# Patient Record
Sex: FEMALE | Race: WHITE | NOT HISPANIC OR LATINO | Employment: UNEMPLOYED | URBAN - NONMETROPOLITAN AREA
[De-identification: names, ages, dates, MRNs, and addresses within clinical notes are randomized per-mention and may not be internally consistent; named-entity substitution may affect disease eponyms.]

---

## 2017-03-08 ENCOUNTER — OFFICE VISIT (OUTPATIENT)
Dept: FAMILY MEDICINE CLINIC | Facility: CLINIC | Age: 52
End: 2017-03-08

## 2017-03-08 VITALS
HEART RATE: 84 BPM | SYSTOLIC BLOOD PRESSURE: 138 MMHG | OXYGEN SATURATION: 99 % | DIASTOLIC BLOOD PRESSURE: 80 MMHG | BODY MASS INDEX: 32.86 KG/M2 | WEIGHT: 216.1 LBS

## 2017-03-08 DIAGNOSIS — M54.50 ACUTE MIDLINE LOW BACK PAIN WITHOUT SCIATICA: ICD-10-CM

## 2017-03-08 DIAGNOSIS — R10.31 RLQ ABDOMINAL PAIN: ICD-10-CM

## 2017-03-08 DIAGNOSIS — Z12.39 SCREENING FOR BREAST CANCER: ICD-10-CM

## 2017-03-08 DIAGNOSIS — Z23 NEED FOR TDAP VACCINATION: ICD-10-CM

## 2017-03-08 DIAGNOSIS — I49.9 IRREGULAR HEART BEAT: Primary | ICD-10-CM

## 2017-03-08 DIAGNOSIS — Z23 PNEUMOCOCCAL VACCINATION ADMINISTERED AT CURRENT VISIT: ICD-10-CM

## 2017-03-08 DIAGNOSIS — Z12.4 SCREENING FOR CERVICAL CANCER: ICD-10-CM

## 2017-03-08 PROCEDURE — 90471 IMMUNIZATION ADMIN: CPT | Performed by: FAMILY MEDICINE

## 2017-03-08 PROCEDURE — 90715 TDAP VACCINE 7 YRS/> IM: CPT | Performed by: FAMILY MEDICINE

## 2017-03-08 PROCEDURE — 90732 PPSV23 VACC 2 YRS+ SUBQ/IM: CPT | Performed by: FAMILY MEDICINE

## 2017-03-08 PROCEDURE — 90472 IMMUNIZATION ADMIN EACH ADD: CPT | Performed by: FAMILY MEDICINE

## 2017-03-08 PROCEDURE — 93010 ELECTROCARDIOGRAM REPORT: CPT | Performed by: INTERNAL MEDICINE

## 2017-03-08 PROCEDURE — 93005 ELECTROCARDIOGRAM TRACING: CPT | Performed by: FAMILY MEDICINE

## 2017-03-08 PROCEDURE — 99214 OFFICE O/P EST MOD 30 MIN: CPT | Performed by: FAMILY MEDICINE

## 2017-03-09 DIAGNOSIS — R10.31 RLQ ABDOMINAL PAIN: Primary | ICD-10-CM

## 2017-03-17 ENCOUNTER — APPOINTMENT (OUTPATIENT)
Dept: CT IMAGING | Facility: HOSPITAL | Age: 52
End: 2017-03-17
Attending: FAMILY MEDICINE

## 2017-03-29 ENCOUNTER — TELEPHONE (OUTPATIENT)
Dept: FAMILY MEDICINE CLINIC | Facility: CLINIC | Age: 52
End: 2017-03-29

## 2017-03-30 NOTE — TELEPHONE ENCOUNTER
RESULTS & RECOMMENDATIONS RELAYED pr Dr. Hernandez's instruction  Ms Gutierrez has been called with her 24 hour Holter Monitor Results, they were Normal.      ----- Message from Enmanuel Hernandez MD sent at 3/24/2017 12:33 PM CDT -----  Normal

## 2017-05-17 NOTE — PROGRESS NOTES
Subjective   Maryuri Gutierrez is a 52 y.o. female.     Back Pain   This is a recurrent problem. The current episode started 1 to 4 weeks ago. The problem occurs constantly. The problem has been waxing and waning since onset. The pain is present in the lumbar spine. The quality of the pain is described as stabbing. Radiates to: to lower abd on right. The pain is at a severity of 4/10. The pain is mild. The pain is the same all the time. The symptoms are aggravated by lying down. Stiffness is present in the morning. Associated symptoms include abdominal pain. Pertinent negatives include no bladder incontinence, bowel incontinence, chest pain, dysuria or fever.   Abdominal Pain   This is a recurrent problem. The current episode started 1 to 4 weeks ago. The onset quality is sudden. The problem occurs intermittently. The problem has been rapidly worsening. The pain is located in the RLQ. The pain is at a severity of 4/10. The pain is mild. The quality of the pain is sharp. The abdominal pain does not radiate. Associated symptoms include nausea. Pertinent negatives include no diarrhea, dysuria, fever, hematochezia, hematuria, melena or vomiting.   Palpitations    This is a recurrent problem. The current episode started more than 1 month ago. The problem has been waxing and waning. On average, each episode lasts 1 minute. Associated symptoms include an irregular heartbeat, malaise/fatigue and nausea. Pertinent negatives include no chest pain, fever, near-syncope or vomiting.        The following portions of the patient's history were reviewed and updated as appropriate: allergies, current medications, past family history, past medical history, past social history, past surgical history and problem list.    Review of Systems   Constitutional: Positive for malaise/fatigue. Negative for fever.   Cardiovascular: Positive for palpitations. Negative for chest pain and near-syncope.   Gastrointestinal: Positive for abdominal  pain and nausea. Negative for bowel incontinence, diarrhea, hematochezia, melena and vomiting.   Genitourinary: Negative for bladder incontinence, dysuria and hematuria.   Musculoskeletal: Positive for back pain.       Objective   Physical Exam   Constitutional: She is oriented to person, place, and time. She appears well-developed and well-nourished. No distress.   HENT:   Head: Normocephalic and atraumatic.   Cardiovascular: Normal rate, regular rhythm and normal heart sounds.  Exam reveals no gallop and no friction rub.    No murmur heard.  Pulmonary/Chest: Effort normal and breath sounds normal. No respiratory distress. She has no wheezes. She has no rales. She exhibits no tenderness.   Abdominal: Soft. Normal appearance. There is no hepatosplenomegaly. There is tenderness in the right lower quadrant. There is no rigidity, no rebound and no guarding.   Neurological: She is alert and oriented to person, place, and time.   Skin: Skin is warm and dry. She is not diaphoretic.   Psychiatric: She has a normal mood and affect. Her behavior is normal. Judgment and thought content normal.   Nursing note and vitals reviewed.      Assessment/Plan   Problems Addressed this Visit     None      Visit Diagnoses     Irregular heart beat    -  Primary    Relevant Orders    Ambulatory Referral to Cardiology    Holter Monitor - 24 Hour    RLQ abdominal pain        Relevant Orders    Comprehensive Metabolic Panel    Lipase    CBC Auto Differential    Urinalysis With Microscopic    Amylase    CT Abdomen Pelvis With & Without Contrast    Acute midline low back pain without sciatica        Screening for breast cancer        Relevant Orders    Mammo Screening Bilateral With CAD    Screening for cervical cancer        Relevant Orders    Ambulatory Referral to Family Practice                  close supervision/contact guard

## 2017-09-14 ENCOUNTER — TRANSCRIBE ORDERS (OUTPATIENT)
Dept: ULTRASOUND IMAGING | Facility: HOSPITAL | Age: 52
End: 2017-09-14

## 2017-09-14 DIAGNOSIS — E01.0 THYROMEGALY: Primary | ICD-10-CM

## 2017-09-19 ENCOUNTER — HOSPITAL ENCOUNTER (OUTPATIENT)
Dept: ULTRASOUND IMAGING | Facility: HOSPITAL | Age: 52
Discharge: HOME | End: 2017-09-19
Admitting: NURSE PRACTITIONER

## 2017-09-19 DIAGNOSIS — E01.0 THYROMEGALY: ICD-10-CM

## 2017-09-19 PROCEDURE — 76536 US EXAM OF HEAD AND NECK: CPT

## 2019-07-10 ENCOUNTER — OFFICE VISIT (OUTPATIENT)
Dept: GASTROENTEROLOGY | Facility: CLINIC | Age: 54
End: 2019-07-10

## 2019-07-10 VITALS
HEIGHT: 68 IN | HEART RATE: 99 BPM | WEIGHT: 195.8 LBS | SYSTOLIC BLOOD PRESSURE: 104 MMHG | BODY MASS INDEX: 29.67 KG/M2 | DIASTOLIC BLOOD PRESSURE: 68 MMHG

## 2019-07-10 DIAGNOSIS — K21.00 GASTROESOPHAGEAL REFLUX DISEASE WITH ESOPHAGITIS: ICD-10-CM

## 2019-07-10 DIAGNOSIS — R19.7 DIARRHEA, UNSPECIFIED TYPE: Primary | ICD-10-CM

## 2019-07-10 DIAGNOSIS — Z87.19 HISTORY OF COLITIS: ICD-10-CM

## 2019-07-10 DIAGNOSIS — R10.84 GENERALIZED ABDOMINAL PAIN: ICD-10-CM

## 2019-07-10 DIAGNOSIS — R11.0 NAUSEA: ICD-10-CM

## 2019-07-10 PROCEDURE — 99214 OFFICE O/P EST MOD 30 MIN: CPT | Performed by: PHYSICIAN ASSISTANT

## 2019-07-10 RX ORDER — DEXTROSE AND SODIUM CHLORIDE 5; .45 G/100ML; G/100ML
30 INJECTION, SOLUTION INTRAVENOUS CONTINUOUS PRN
Status: CANCELLED | OUTPATIENT
Start: 2019-08-12

## 2019-07-10 RX ORDER — ERGOCALCIFEROL 1.25 MG/1
50000 CAPSULE ORAL WEEKLY
COMMUNITY

## 2019-07-10 RX ORDER — FEXOFENADINE HCL 180 MG/1
180 TABLET ORAL DAILY
COMMUNITY

## 2019-07-10 RX ORDER — SODIUM, POTASSIUM,MAG SULFATES 17.5-3.13G
SOLUTION, RECONSTITUTED, ORAL ORAL
Qty: 1 BOTTLE | Refills: 0 | Status: SHIPPED | OUTPATIENT
Start: 2019-07-10 | End: 2019-08-12 | Stop reason: HOSPADM

## 2019-07-10 RX ORDER — PROMETHAZINE HYDROCHLORIDE 25 MG/1
25 TABLET ORAL EVERY 6 HOURS PRN
COMMUNITY

## 2019-07-10 RX ORDER — SUCRALFATE 1 G/1
1 TABLET ORAL 2 TIMES DAILY
COMMUNITY

## 2019-07-10 RX ORDER — PANTOPRAZOLE SODIUM 40 MG/1
40 TABLET, DELAYED RELEASE ORAL DAILY
COMMUNITY

## 2019-07-10 RX ORDER — DICYCLOMINE HYDROCHLORIDE 10 MG/1
10 CAPSULE ORAL 3 TIMES DAILY
COMMUNITY
End: 2019-08-06

## 2019-07-10 RX ORDER — RANITIDINE HCL 75 MG
75 TABLET ORAL DAILY
COMMUNITY
End: 2019-08-08

## 2019-07-10 RX ORDER — LEVOTHYROXINE SODIUM 0.03 MG/1
25 TABLET ORAL DAILY
COMMUNITY

## 2019-07-10 NOTE — PROGRESS NOTES
Chief Complaint   Patient presents with   • Abdominal Pain     Ref. LORAINE BLEVINS   • Heartburn       ENDO PROCEDURE ORDERED: EGD/COLON N/abd pain, look TI    Subjective    Maryuri Gutierrez is a 54 y.o. female. she is being seen for consultation today at the request of GEN Hussein.    History of Present Illness    This 54-year-old female works in [employer] and was sent for consultation by GEN Hussein, who saw the patient on 05/07/2019 with complaints of nausea, abdominal pain, increased reflux, and diarrhea.  Patient had been referred to Dr. Bird, then to Dr. Mac, then to Dr. Albarran, then to Dr. Delgado.  I had last seen her on 09/17/2015 with abdominal pain, history of colitis with diverticular disease.  She had not returned for a follow-up.  She was accompanied by her sister.  She had an abdominal ultrasound at [place] on 05/10/2019 that showed a normal liver, postcholecystectomy, and 5.0 mm common bile duct.  Last laboratories from 11/08/2018 showed normal CBC, CMP, and TSH.  Vitamin D was low at 19.        Patient states her abdomen swells.  She hurts all over.  She has had a fever that lasts several days at a time.  She had a previous episode about a year ago, but she feels like she is getting worse.  She states she went to see someone in GI in [place] a year ago and they gave her some kind of medications that she thought helped her, but her last endoscopy was with us on 09/02/2015.  She had a grade 2 esophagitis, gastritis and hemorrhoids.             Patient states that she has diarrhea daily that has been worse since cholecystectomy that was done by Dr. Grimaldo.  When she eats, she has urgent bowel movements.  Her stools are watery to formed.  She has been given Bentyl in the past, but it seems to bloat her more.  She denied blood or mucus in her stool.  Weight is down 6.5 pounds since her last visit with me.  She has not noticed any specific foods that seem to change  her symptoms.  Her pain is fairly constant.  She has severe fatigue.  She has nausea all the time.  She is on Protonix, Carafate b.i.d., Zantac 75 mg at night.  They have helped some.  She complains of 3 out of 10 epigastric pain today.  She states she will get a knot the size of a ball in her epigastric region.        Patient is a current pack-a-day smoker for 38 years, strongly encouraged to quit.  She denied alcohol or illicit substance use.      She has a history of colitis, gallstones, cholecystectomy, appendectomy, knee surgery, and hysterectomy.        Family history of diabetes, heart disease, unknown cancer, stroke, gallstones, hypertension, kidney disease, nervous problems, and allergies.  Father and mother are in good health.  Spouse  of a heart attack at age 59 in 2017.  One brother, 1 sister, and 1 child in good health.          A/P:  Patient with nausea, increasing GERD, abdominal pain, diarrhea, with previous history of colitis.  Recommend EGD/colonoscopy.  Will attempt to look at the terminal ileum.  Will do biopsies as indicated.      Possible bile salt reflux and diarrhea.  Suggested a trial on Colestid 1 g b.i.d.  She may try stopping her other medications as they do not seem to be helping her particularly, the Carafate and Bentyl.  Will plan follow-up after the above.  Further pending clinical course and the results of the above.         Thank you very much, Priscila, for this consultation, and for allowing us to participate in the care of your patient.  Will keep you informed.         The following portions of the patient's history were reviewed and updated as appropriate:   Past Medical History:   Diagnosis Date   • Abdominal pain     through to back   • Acute bronchitis, unspecified    • Acute serous otitis media, bilateral    • Biliary dyskinesia    • Chest pain    • Cholelithiasis and cholecystitis without obstruction    • Chronic cholecystitis    • Colitis    • Depressive disorder     • Diarrhea    • Esophagitis     Grade II on Prilosec OTC BID      • Gastritis    • Generalized abdominal pain    • GERD (gastroesophageal reflux disease)    • Indeterminate colitis    • Nausea & vomiting    • Pain in right knee    • Periumbilical pain      Past Surgical History:   Procedure Laterality Date   • COLONOSCOPY  07/07/2014    Internal & external hemorrhoids found.   • COLONOSCOPY  09/02/2015    Internal and external hemorrhoids found.   • INJECTION OF MEDICATION  10/21/2014    Celestone (betamethasone) (2)      • INJECTION OF MEDICATION  09/25/2012    Dexamethasone (1)      • INJECTION OF MEDICATION  06/02/2016    Kenalog (1)      • INJECTION OF MEDICATION  06/13/2014    Toradol (1)      • LAPAROSCOPIC CHOLECYSTECTOMY  07/16/2014    with intraoperative cholangiogram. Fluoroscopic cholangiography-professional component.   • UPPER GASTROINTESTINAL ENDOSCOPY  09/02/2015    Esophagitis seen.Biopsy taken.Gastritis was found in the stomach.Biopsy taken.Normal duodenum.Biopsy taken.   • UPPER GASTROINTESTINAL ENDOSCOPY  07/07/2014    Normal esophagus. Gastritis in stomach. Biopsy taken. Normal duodenum. Biopsy taken.     Family History   Problem Relation Age of Onset   • Diabetes Mother    • Stroke Mother    • Hypertension Mother    • Diabetes Father    • Hypertension Father    • Cancer Maternal Aunt      OB History     No data available        Allergies   Allergen Reactions   • Amoxicillin    • Augmentin [Amoxicillin-Pot Clavulanate]    • Sulfamethoxazole-Trimethoprim Itching     Social History     Socioeconomic History   • Marital status:      Spouse name: Not on file   • Number of children: Not on file   • Years of education: Not on file   • Highest education level: Not on file   Tobacco Use   • Smoking status: Current Every Day Smoker     Packs/day: 1.00     Types: Cigarettes   • Smokeless tobacco: Never Used   Substance and Sexual Activity   • Alcohol use: Yes     Frequency: Monthly or less      "Comment: socially   • Drug use: No     Current Medications:  Prior to Admission medications    Medication Sig Start Date End Date Taking? Authorizing Provider   dicyclomine (BENTYL) 10 MG capsule Take 10 mg by mouth 3 (Three) Times a Day.   Yes Lucina Orosco MD   fexofenadine (ALLEGRA) 180 MG tablet Take 180 mg by mouth Daily.   Yes Lucina Orosco MD   levothyroxine (SYNTHROID, LEVOTHROID) 25 MCG tablet Take 25 mcg by mouth Daily.   Yes Lucina Orosco MD   pantoprazole (PROTONIX) 40 MG EC tablet Take 40 mg by mouth Daily.   Yes Lucina Orosco MD   promethazine (PHENERGAN) 25 MG tablet Take 25 mg by mouth Every 6 (Six) Hours As Needed for Nausea or Vomiting.   Yes Lucina Orosco MD   raNITIdine (ZANTAC) 75 MG tablet Take 75 mg by mouth Daily.   Yes Lucina Orosco MD   sucralfate (CARAFATE) 1 g tablet Take 1 g by mouth 2 (Two) Times a Day.   Yes Lucina Orosco MD   vitamin D (ERGOCALCIFEROL) 43502 units capsule capsule Take 50,000 Units by mouth 1 (One) Time Per Week.   Yes Lucina Orosco MD     Review of Systems  Review of Systems   Constitutional: Negative for unexpected weight change.   HENT: Negative for trouble swallowing.    Gastrointestinal: Positive for abdominal distention, abdominal pain, diarrhea and nausea. Negative for anal bleeding, blood in stool, constipation, rectal pain and vomiting.   Genitourinary: Positive for flank pain.   All other systems reviewed and are negative.         Objective    /68 (BP Location: Left arm)   Pulse 99   Ht 172.7 cm (68\")   Wt 88.8 kg (195 lb 12.8 oz)   BMI 29.77 kg/m²   Physical Exam   Constitutional: She is oriented to person, place, and time. She appears well-developed and well-nourished. No distress.   HENT:   Head: Normocephalic and atraumatic.   Eyes: EOM are normal. Pupils are equal, round, and reactive to light.   Neck: Normal range of motion.   Cardiovascular: Normal rate, regular rhythm and normal " heart sounds.   Pulmonary/Chest: Effort normal and breath sounds normal.   Abdominal: Soft. Bowel sounds are normal. She exhibits no shifting dullness, no distension, no abdominal bruit, no ascites and no mass. There is no hepatosplenomegaly. There is tenderness. There is no rigidity, no rebound, no guarding and no CVA tenderness. No hernia. Hernia confirmed negative in the ventral area.   Obese, mild diffuse   Musculoskeletal: Normal range of motion.   Neurological: She is alert and oriented to person, place, and time.   Skin: Skin is warm and dry.   Psychiatric: She has a normal mood and affect. Her behavior is normal. Judgment and thought content normal.   Nursing note and vitals reviewed.    Assessment/Plan      1. Diarrhea, unspecified type    2. Generalized abdominal pain    3. Nausea    4. Gastroesophageal reflux disease with esophagitis    5. History of colitis    .   Maryuri was seen today for abdominal pain and heartburn.    Diagnoses and all orders for this visit:    Diarrhea, unspecified type  -     Case Request; Standing  -     dextrose 5 % and sodium chloride 0.45 % infusion  -     Case Request    Generalized abdominal pain  -     Case Request; Standing  -     dextrose 5 % and sodium chloride 0.45 % infusion  -     Case Request    Nausea  -     Case Request; Standing  -     dextrose 5 % and sodium chloride 0.45 % infusion  -     Case Request    Gastroesophageal reflux disease with esophagitis  -     Case Request; Standing  -     dextrose 5 % and sodium chloride 0.45 % infusion  -     Case Request    History of colitis  -     Case Request; Standing  -     dextrose 5 % and sodium chloride 0.45 % infusion  -     Case Request    Other orders  -     Follow Anesthesia Guidelines / Standing Orders; Future  -     Obtain Informed Consent; Future  -     Obtain Informed Consent; Standing  -     POC Glucose Once; Standing  -     Pregnancy, Urine -; Standing  -     sodium-potassium-magnesium sulfates (SUPREP BOWEL  PREP KIT) 17.5-3.13-1.6 GM/177ML solution oral solution; As directed per instruction sheet for colonoscopy        Orders placed during this encounter include:  Orders Placed This Encounter   Procedures   • Follow Anesthesia Guidelines / Standing Orders     Standing Status:   Future   • Obtain Informed Consent     Standing Status:   Future     Order Specific Question:   Informed Consent Given For     Answer:   ESOPHAGOGASTRODUODENOSCOPY and colonoscopy       Medications prescribed:  New Medications Ordered This Visit   Medications   • sodium-potassium-magnesium sulfates (SUPREP BOWEL PREP KIT) 17.5-3.13-1.6 GM/177ML solution oral solution     Sig: As directed per instruction sheet for colonoscopy     Dispense:  1 bottle     Refill:  0       Requested Prescriptions     Signed Prescriptions Disp Refills   • sodium-potassium-magnesium sulfates (SUPREP BOWEL PREP KIT) 17.5-3.13-1.6 GM/177ML solution oral solution 1 bottle 0     Sig: As directed per instruction sheet for colonoscopy       Review and/or summary of lab tests, radiology, procedures, medications. Review and summary of old records and obtaining of history. The risks and benefits of my recommendations, as well as other treatment options were discussed with the patient today. Questions were answered.    Follow-up: Return if symptoms worsen or fail to improve, for After the above.     ESOPHAGOGASTRODUODENOSCOPY (N/A), COLONOSCOPY--look TI (N/A)      This document has been electronically signed by Ayush Jackson PA-C on July 12, 2019 1:41 PM      Results for orders placed or performed in visit on 10/24/16    COLONOSCOPY   Result Value Ref Range     Colonoscopy complete    Results for orders placed or performed during the hospital encounter of 06/02/16   Magnesium   Result Value Ref Range    Magnesium 2.09 1.60 - 2.30 mg/dl   Vitamin B12   Result Value Ref Range    Vitamin B-12 365 239 - 931 pg/ml   Lipid panel   Result Value Ref Range    Total Cholesterol  207 (H) 0 - 199 mg/dl    Triglycerides 136 20 - 199 mg/dl    HDL Cholesterol 45 (L) 60 - 200 mg/dl    LDL Cholesterol  135 (H) 0 - 129 mg/dl   Comprehensive metabolic panel   Result Value Ref Range    Sodium 142 137 - 145 mmol/L    Potassium 4.0 3.5 - 5.1 mmol/L    Chloride 102 95 - 110 mmol/L    CO2 26 22 - 31 mmol/L    Anion Gap 14.0 5.0 - 15.0 mmol/L    Glucose 92 60 - 100 mg/dl    BUN 11 7 - 21 mg/dl    Creatinine 0.8 0.5 - 1.0 mg/dl    GFR MDRD Non  76 51 - 120 mL/min/1.73 sq.M    GFR MDRD  92 51 - 120 mL/min/1.73 sq.M    Calcium 9.8 8.4 - 10.2 mg/dl    Total Protein 7.6 6.3 - 8.6 gm/dl    Albumin 4.3 3.4 - 4.8 gm/dl    Total Bilirubin 0.5 0.2 - 1.3 mg/dl    Alkaline Phosphatase 116 38 - 126 U/L    AST (SGOT) 33 14 - 36 U/L    ALT (SGPT) 39 9 - 52 U/L   Results for orders placed or performed during the hospital encounter of 09/02/15   Urinalysis Without Microscopic   Result Value Ref Range    Color, UA YELLOW     Appearance CLEAR     Specific Gravity, UA 1.010 1.003 - 1.030    pH, UA 5.5 pH Units    Leukocytes, UA NEGATIVE NEGATIVE    Nitrite, UA NEGATIVE NEGATIVE    Protein, UA NEGATIVE NEGATIVE    Glucose, Urine NEGATIVE NEGATIVE mg/dl    Ketones, UA NEGATIVE NEGATIVE    Urobilinogen, UA 0.2 0.2 EU/dl    Blood, UA NEGATIVE NEGATIVE   CBC and Differential   Result Value Ref Range    WBC 12.2 (H) 3.2 - 9.8 x1000/uL    RBC 4.99 3.77 - 5.16 james/mm3    Hemoglobin 15.3 12.0 - 15.5 gm/dl    Hematocrit 42.9 35.0 - 45.0 %    MCV 86.0 80.0 - 98.0 fl    MCH 30.7 26.0 - 34.0 pg    MCHC 35.7 31.4 - 36.0 gm/dl    RDW 13.2 11.5 - 14.5 %    Platelets 294 150 - 450 x1000/mm3    MPV 9.6 8.0 - 12.0 fl    Neutrophil Rel % 74.2 37.0 - 80.0 %    Lymphocyte Rel % 18.8 10.0 - 50.0 %    Monocyte Rel % 5.7 0.0 - 12.0 %    Eosinophil Rel % 0.7 0.0 - 7.0 %    Basophil Rel % 0.2 0.0 - 2.0 %    Immature Granulocyte Rel % 0.40 0.00 - 0.50 %    Neutrophils Absolute 9.05 (H) 2.00 - 8.60 x1000/uL     Lymphocytes Absolute 2.29 0.60 - 4.20 x1000/uL    Monocytes Absolute 0.70 0.00 - 0.90 x1000/uL    Eosinophils Absolute 0.08 0.00 - 0.70 x1000/uL    Basophils Absolute 0.03 0.00 - 0.20 x1000/uL    Immature Granulocytes Absolute 0.050 (H) 0.005 - 0.022 x1000/uL   POCT Glucose Fingerstick   Result Value Ref Range    Glucose 111 (H) 60 - 100 mg/dl   Lipase   Result Value Ref Range    Lipase 78 23 - 300 U/L   Comprehensive metabolic panel   Result Value Ref Range    Sodium 137 137 - 145 mmol/L    Potassium 3.8 3.5 - 5.1 mmol/L    Chloride 106 95 - 110 mmol/L    CO2 22 22 - 31 mmol/L    Anion Gap 9.0 5.0 - 15.0 mmol/L    Glucose 90 60 - 100 mg/dl    BUN 10 7 - 21 mg/dl    Creatinine 0.7 0.5 - 1.0 mg/dl    GFR MDRD Non  89 58 - 135 mL/min/1.73 sq.M    GFR MDRD  108 58 - 135 mL/min/1.73 sq.M    Calcium 8.5 8.4 - 10.2 mg/dl    Total Protein 6.6 6.3 - 8.6 gm/dl    Albumin 3.5 3.4 - 4.8 gm/dl    Total Bilirubin 0.4 0.2 - 1.3 mg/dl    Alkaline Phosphatase 111 38 - 126 U/L    AST (SGOT) 28 14 - 36 U/L    ALT (SGPT) 24 9 - 52 U/L   Results for orders placed or performed during the hospital encounter of 09/02/15   Converted Surgical Pathology   Result Value Ref Range    Spec Descr 1 SPECIMEN(S): A SMALL BOWEL BIOPSY     Specimen description 2 SPECIMEN(S): B ANTRAL BIOPSY     Specimen description 3 SPECIMEN(S): C ESOPHAGEAL BIOPSY     Preoperative Diagnosis         PREOPERATIVE DIAGNOSIS:  Abdominal pain; gastritis      Postoperative Diagnosis   POSTOPERATIVE DIAGNOSIS:  None Given       Gross Description      Final Diagnosis      Comment      CONVERTED (HISTORICAL) FINAL PATHOLOGIST       Diagnostician:  STACEY NEWTON M.D.  Pathologist  Electronically Signed 09/04/2015      Diagnosis Code   DIAGNOSIS CODE:  2      Results for orders placed or performed during the hospital encounter of 08/24/15   Glia(IgA/G) and tTG(IgA/G)   Result Value Ref Range    Tissue Transglutaminase IgA <2 0 - 3 U/mL     Tissue Transglutaminase IgG <2 0 - 5 U/mL    Gliadin Deamidated Peptide Ab, IgA 6 0 - 19 units    Deaminated Gliadin Ab IgG 1 0 - 19 units   Allergens(12)Foods   Result Value Ref Range    Peanut <0.10 CLASS 0 kU/L    Milk, Cow's <0.10 CLASS 0 kU/L    Soybean <0.10 CLASS 0 kU/L    Wheat <0.10 CLASS 0 kU/L    CodFish <0.10 CLASS 0 kU/L    Shrimp <0.10 CLASS 0 kU/L    [place] <0.10 CLASS 0 kU/L    Egg White <0.10 CLASS 0 kU/L    Sesame Seed <0.10 CLASS 0 kU/L    Scallop <0.10 CLASS 0 kU/L    Hazelnut <0.10 CLASS 0 kU/L    Gluten <0.10 CLASS 0 kU/L    Class Description Comment      *Note: Due to a large number of results and/or encounters for the requested time period, some results have not been displayed. A complete set of results can be found in Results Review.       Some portions of this note have been dictated using voice recognition software and may contain errors and/or omissions.

## 2019-07-10 NOTE — PATIENT INSTRUCTIONS

## 2019-08-02 DIAGNOSIS — R19.7 DIARRHEA, UNSPECIFIED TYPE: Primary | ICD-10-CM

## 2019-08-05 ENCOUNTER — APPOINTMENT (OUTPATIENT)
Dept: LAB | Facility: HOSPITAL | Age: 54
End: 2019-08-05

## 2019-08-07 ENCOUNTER — OFFICE VISIT (OUTPATIENT)
Dept: GASTROENTEROLOGY | Facility: CLINIC | Age: 54
End: 2019-08-07

## 2019-08-07 VITALS
SYSTOLIC BLOOD PRESSURE: 116 MMHG | BODY MASS INDEX: 29.8 KG/M2 | DIASTOLIC BLOOD PRESSURE: 80 MMHG | OXYGEN SATURATION: 98 % | HEART RATE: 92 BPM | HEIGHT: 68 IN | WEIGHT: 196.6 LBS

## 2019-08-07 DIAGNOSIS — R19.7 DIARRHEA, UNSPECIFIED TYPE: Primary | ICD-10-CM

## 2019-08-07 DIAGNOSIS — R11.0 NAUSEA: ICD-10-CM

## 2019-08-07 DIAGNOSIS — Z87.19 HISTORY OF COLITIS: ICD-10-CM

## 2019-08-07 DIAGNOSIS — R10.84 GENERALIZED ABDOMINAL PAIN: ICD-10-CM

## 2019-08-07 DIAGNOSIS — K21.00 GASTROESOPHAGEAL REFLUX DISEASE WITH ESOPHAGITIS: ICD-10-CM

## 2019-08-07 PROCEDURE — 99213 OFFICE O/P EST LOW 20 MIN: CPT | Performed by: PHYSICIAN ASSISTANT

## 2019-08-07 RX ORDER — MONTELUKAST SODIUM 4 MG/1
1 TABLET, CHEWABLE ORAL 2 TIMES DAILY
Qty: 60 TABLET | Refills: 2 | Status: SHIPPED | OUTPATIENT
Start: 2019-08-07

## 2019-08-07 NOTE — PATIENT INSTRUCTIONS
Abdominal Bloating  When you have abdominal bloating, your abdomen may feel full, tight, or painful. It may also look bigger than normal or swollen (distended). Common causes of abdominal bloating include:  · Swallowing air.  · Constipation.  · Problems digesting food.  · Eating too much.  · Irritable bowel syndrome. This is a condition that affects the large intestine.  · Lactose intolerance. This is an inability to digest lactose, a natural sugar in dairy products.  · Celiac disease. This is a condition that affects the ability to digest gluten, a protein found in some grains.  · Gastroparesis. This is a condition that slows down the movement of food in the stomach and small intestine. It is more common in people with diabetes mellitus.  · Gastroesophageal reflux disease (GERD). This is a digestive condition that makes stomach acid flow back into the esophagus.  · Urinary retention. This means that the body is holding onto urine, and the bladder cannot be emptied all the way.  Follow these instructions at home:  Eating and drinking  · Avoid eating too much.  · Try not to swallow air while talking or eating.  · Avoid eating while lying down.  · Avoid these foods and drinks:  ? Foods that cause gas, such as broccoli, cabbage, cauliflower, and baked beans.  ? Carbonated drinks.  ? Hard candy.  ? Chewing gum.  Medicines  · Take over-the-counter and prescription medicines only as told by your health care provider.  · Take probiotic medicines. These medicines contain live bacteria or yeasts that can help digestion.  · Take coated peppermint oil capsules.  Activity  · Try to exercise regularly. Exercise may help to relieve bloating that is caused by gas and relieve constipation.  General instructions  · Keep all follow-up visits as told by your health care provider. This is important.  Contact a health care provider if:  · You have nausea and vomiting.  · You have diarrhea.  · You have abdominal pain.  · You have unusual  weight loss or weight gain.  · You have severe pain, and medicines do not help.  Get help right away if:  · You have severe chest pain.  · You have trouble breathing.  · You have shortness of breath.  · You have trouble urinating.  · You have darker urine than normal.  · You have blood in your stools or have dark, tarry stools.  Summary  · Abdominal bloating means that the abdomen is swollen.  · Common causes of abdominal bloating are swallowing air, constipation, and problems digesting food.  · Avoid eating too much and avoid swallowing air.  · Avoid foods that cause gas, carbonated drinks, hard candy, and chewing gum.  This information is not intended to replace advice given to you by your health care provider. Make sure you discuss any questions you have with your health care provider.  Document Released: 01/19/2018 Document Revised: 01/19/2018 Document Reviewed: 01/19/2018  [employer] Interactive Patient Education © 2019 [employer]  Nausea, Adult  Nausea is the feeling of an upset stomach or having to vomit. Nausea on its own is not usually a serious concern, but it may be an early sign of a more serious medical problem. As nausea gets worse, it can lead to vomiting. If vomiting develops, or if you are not able to drink enough fluids, you are at risk of becoming dehydrated. Dehydration can make you tired and thirsty, cause you to have a dry mouth, and decrease how often you urinate. Older adults and people with other diseases or a weak immune system are at higher risk for dehydration. The main goals of treating your nausea are:  · To limit repeated nausea episodes.  · To prevent vomiting and dehydration.  Follow these instructions at home:  Follow instructions from your health care provider about how to care for yourself at home.  Eating and drinking  Follow these recommendations as told by your health care provider:  · Take an oral rehydration solution (ORS). This is a drink that is sold at pharmacies and  retail stores.  · Drink clear fluids in small amounts as you are able. Clear fluids include water, ice chips, diluted fruit juice, and low-calorie sports drinks.  · Eat bland, easy-to-digest foods in small amounts as you are able. These foods include bananas, applesauce, rice, lean meats, toast, and crackers.  · Avoid drinking fluids that contain a lot of sugar or caffeine, such as energy drinks, sports drinks, and soda.  · Avoid alcohol.  · Avoid spicy or fatty foods.  General instructions  · Drink enough fluid to keep your urine clear or pale yellow.  · Wash your hands often. If soap and water are not available, use hand .  · Make sure that all people in your household wash their hands well and often.  · Rest at home while you recover.  · Take over-the-counter and prescription medicines only as told by your health care provider.  · Breathe slowly and deeply when you feel nauseous.  · Watch your condition for any changes.  · Keep all follow-up visits as told by your health care provider. This is important.  Contact a health care provider if:  · You have a headache.  · You have new symptoms.  · Your nausea gets worse.  · You have a fever.  · You feel light-headed or dizzy.  · You vomit.  · You cannot keep fluids down.  Get help right away if:  · You have pain in your chest, neck, arm, or jaw.  · You feel extremely weak or you faint.  · You have vomit that is bright red or looks like coffee grounds.  · You have bloody or black stools or stools that look like tar.  · You have a severe headache, a stiff neck, or both.  · You have severe pain, cramping, or bloating in your abdomen.  · You have a rash.  · You have difficulty breathing or are breathing very quickly.  · Your heart is beating very quickly.  · Your skin feels cold and clammy.  · You feel confused.  · You have pain when you urinate.  · You have signs of dehydration, such as:  ? Dark urine, very little, or no urine.  ? Cracked lips.  ? Dry  mouth.  ? Sunken eyes.  ? Sleepiness.  ? Weakness.  These symptoms may represent a serious problem that is an emergency. Do not wait to see if the symptoms will go away. Get medical help right away. Call your local emergency services (911 in the [place] Do not drive yourself to the hospital.  This information is not intended to replace advice given to you by your health care provider. Make sure you discuss any questions you have with your health care provider.  Document Released: 01/25/2006 Document Revised: 05/22/2017 Document Reviewed: 08/23/2016  [employer] Interactive Patient Education © 2019 [employer]  Diarrhea, Adult  Diarrhea is frequent loose and watery bowel movements. Diarrhea can make you feel weak and cause you to become dehydrated. Dehydration can make you tired and thirsty, cause you to have a dry mouth, and decrease how often you urinate. Diarrhea typically lasts 2-3 days. However, it can last longer if it is a sign of something more serious. It is important to treat your diarrhea as told by your health care provider.  Follow these instructions at home:  Eating and drinking  Follow these recommendations as told by your health care provider:  · Take an oral rehydration solution (ORS). This is a drink that is sold at pharmacies and retail stores.  · Drink clear fluids, such as water, ice chips, diluted fruit juice, and low-calorie sports drinks.  · Eat bland, easy-to-digest foods in small amounts as you are able. These foods include bananas, applesauce, rice, lean meats, toast, and crackers.  · Avoid drinking fluids that contain a lot of sugar or caffeine, such as energy drinks, sports drinks, and soda.  · Avoid alcohol.  · Avoid spicy or fatty foods.    General instructions  · Drink enough fluid to keep your urine clear or pale yellow.  · Wash your hands often. If soap and water are not available, use hand .  · Make sure that all people in your household wash their hands well and  often.  · Take over-the-counter and prescription medicines only as told by your health care provider.  · Rest at home while you recover.  · Watch your condition for any changes.  · Take a warm bath to relieve any burning or pain from frequent diarrhea episodes.  · Keep all follow-up visits as told by your health care provider. This is important.  Contact a health care provider if:  · You have a fever.  · Your diarrhea gets worse.  · You have new symptoms.  · You cannot keep fluids down.  · You feel light-headed or dizzy.  · You have a headache  · You have muscle cramps.  Get help right away if:  · You have chest pain.  · You feel extremely weak or you faint.  · You have bloody or black stools or stools that look like tar.  · You have severe pain, cramping, or bloating in your abdomen.  · You have trouble breathing or you are breathing very quickly.  · Your heart is beating very quickly.  · Your skin feels cold and clammy.  · You feel confused.  · You have signs of dehydration, such as:  ? Dark urine, very little urine, or no urine.  ? Cracked lips.  ? Dry mouth.  ? Sunken eyes.  ? Sleepiness.  ? Weakness.  This information is not intended to replace advice given to you by your health care provider. Make sure you discuss any questions you have with your health care provider.  Document Released: 12/08/2003 Document Revised: 08/01/2018 Document Reviewed: 08/23/2016  [employer] Interactive Patient Education © 2019 [employer]  BMI for Adults    Body mass index (BMI) is a number that is calculated from a person's weight and height. BMI may help to estimate how much of a person's weight is composed of fat. BMI can help identify those who may be at higher risk for certain medical problems.  How is BMI used with adults?  BMI is used as a screening tool to identify possible weight problems. It is used to check whether a person is obese, overweight, healthy weight, or underweight.  How is BMI calculated?  BMI measures your  "weight and compares it to your height. This can be done either in English ([place] or metric measurements. Note that charts are available to help you find your BMI quickly and easily without having to do these calculations yourself.  To calculate your BMI in English ([place] measurements, your health care provider will:  1. Measure your weight in pounds (lb).  2. Multiply the number of pounds by 703.  ? For example, for a person who weighs 180 lb, multiply that number by 703, which equals 126,540.  3. Measure your height in inches (in). Then multiply that number by itself to get a measurement called \"inches squared.\"  ? For example, for a person who is 70 in tall, the \"inches squared\" measurement is 70 in x 70 in, which equals 4900 inches squared.  4. Divide the total from Step 2 (number of lb x 703) by the total from Step 3 (inches squared): 126,540 ÷ 4900 = 25.8. This is your BMI.  To calculate your BMI in metric measurements, your health care provider will:  1. Measure your weight in kilograms (kg).  2. Measure your height in meters (m). Then multiply that number by itself to get a measurement called \"meters squared.\"  ? For example, for a person who is 1.75 m tall, the \"meters squared\" measurement is 1.75 m x 1.75 m, which is equal to 3.1 meters squared.  3. Divide the number of kilograms (your weight) by the meters squared number. In this example: 70 ÷ 3.1 = 22.6. This is your BMI.  How is BMI interpreted?  To interpret your results, your health care provider will use BMI charts to identify whether you are underweight, normal weight, overweight, or obese. The following guidelines will be used:  · Underweight: BMI less than 18.5.  · Normal weight: BMI between 18.5 and 24.9.  · Overweight: BMI between 25 and 29.9.  · Obese: BMI of 30 and above.  Please note:  · Weight includes both fat and muscle, so someone with a muscular build, such as an athlete, may have a BMI that is higher than 24.9. In cases like these, BMI " is not an accurate measure of body fat.  · To determine if excess body fat is the cause of a BMI of 25 or higher, further assessments may need to be done by a health care provider.  · BMI is usually interpreted in the same way for men and women.  Why is BMI a useful tool?  BMI is useful in two ways:  · Identifying a weight problem that may be related to a medical condition, or that may increase the risk for medical problems.  · Promoting lifestyle and diet changes in order to reach a healthy weight.  Summary  · Body mass index (BMI) is a number that is calculated from a person's weight and height.  · BMI may help to estimate how much of a person's weight is composed of fat. BMI can help identify those who may be at higher risk for certain medical problems.  · BMI can be measured using English measurements or metric measurements.  · To interpret your results, your health care provider will use BMI charts to identify whether you are underweight, normal weight, overweight, or obese.  This information is not intended to replace advice given to you by your health care provider. Make sure you discuss any questions you have with your health care provider.  Document Released: 08/29/2005 Document Revised: 10/31/2018 Document Reviewed: 10/31/2018  [employer] Interactive Patient Education © 2019 [employer]
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
## 2019-08-12 ENCOUNTER — HOSPITAL ENCOUNTER (OUTPATIENT)
Facility: HOSPITAL | Age: 54
Setting detail: HOSPITAL OUTPATIENT SURGERY
Discharge: HOME | End: 2019-08-12
Attending: INTERNAL MEDICINE | Admitting: INTERNAL MEDICINE

## 2019-08-12 ENCOUNTER — ANESTHESIA (OUTPATIENT)
Dept: GASTROENTEROLOGY | Facility: HOSPITAL | Age: 54
End: 2019-08-12

## 2019-08-12 ENCOUNTER — ANESTHESIA EVENT (OUTPATIENT)
Dept: GASTROENTEROLOGY | Facility: HOSPITAL | Age: 54
End: 2019-08-12

## 2019-08-12 VITALS
DIASTOLIC BLOOD PRESSURE: 65 MMHG | BODY MASS INDEX: 29.28 KG/M2 | SYSTOLIC BLOOD PRESSURE: 107 MMHG | OXYGEN SATURATION: 96 % | WEIGHT: 193.19 LBS | HEIGHT: 68 IN | TEMPERATURE: 96.9 F | RESPIRATION RATE: 18 BRPM | HEART RATE: 71 BPM

## 2019-08-12 DIAGNOSIS — R11.0 NAUSEA: ICD-10-CM

## 2019-08-12 DIAGNOSIS — R19.7 DIARRHEA, UNSPECIFIED TYPE: ICD-10-CM

## 2019-08-12 DIAGNOSIS — R10.84 GENERALIZED ABDOMINAL PAIN: ICD-10-CM

## 2019-08-12 DIAGNOSIS — Z87.19 HISTORY OF COLITIS: ICD-10-CM

## 2019-08-12 DIAGNOSIS — K21.00 GASTROESOPHAGEAL REFLUX DISEASE WITH ESOPHAGITIS: ICD-10-CM

## 2019-08-12 PROCEDURE — 45380 COLONOSCOPY AND BIOPSY: CPT | Performed by: INTERNAL MEDICINE

## 2019-08-12 PROCEDURE — 88305 TISSUE EXAM BY PATHOLOGIST: CPT | Performed by: INTERNAL MEDICINE

## 2019-08-12 PROCEDURE — 25010000002 PROPOFOL 10 MG/ML EMULSION: Performed by: NURSE ANESTHETIST, CERTIFIED REGISTERED

## 2019-08-12 PROCEDURE — 43239 EGD BIOPSY SINGLE/MULTIPLE: CPT | Performed by: INTERNAL MEDICINE

## 2019-08-12 PROCEDURE — 88305 TISSUE EXAM BY PATHOLOGIST: CPT | Performed by: PATHOLOGY

## 2019-08-12 PROCEDURE — 88342 IMHCHEM/IMCYTCHM 1ST ANTB: CPT | Performed by: PATHOLOGY

## 2019-08-12 PROCEDURE — 88342 IMHCHEM/IMCYTCHM 1ST ANTB: CPT | Performed by: INTERNAL MEDICINE

## 2019-08-12 PROCEDURE — 45385 COLONOSCOPY W/LESION REMOVAL: CPT | Performed by: INTERNAL MEDICINE

## 2019-08-12 RX ORDER — LIDOCAINE HYDROCHLORIDE 20 MG/ML
INJECTION, SOLUTION EPIDURAL; INFILTRATION; INTRACAUDAL; PERINEURAL AS NEEDED
Status: DISCONTINUED | OUTPATIENT
Start: 2019-08-12 | End: 2019-08-12 | Stop reason: SURG

## 2019-08-12 RX ORDER — PROPOFOL 10 MG/ML
VIAL (ML) INTRAVENOUS AS NEEDED
Status: DISCONTINUED | OUTPATIENT
Start: 2019-08-12 | End: 2019-08-12 | Stop reason: SURG

## 2019-08-12 RX ORDER — DEXTROSE AND SODIUM CHLORIDE 5; .45 G/100ML; G/100ML
30 INJECTION, SOLUTION INTRAVENOUS CONTINUOUS PRN
Status: DISCONTINUED | OUTPATIENT
Start: 2019-08-12 | End: 2019-08-12 | Stop reason: HOSPADM

## 2019-08-12 RX ADMIN — LIDOCAINE HYDROCHLORIDE 60 MG: 20 INJECTION, SOLUTION EPIDURAL; INFILTRATION; INTRACAUDAL; PERINEURAL at 09:06

## 2019-08-12 RX ADMIN — PROPOFOL 40 MG: 10 INJECTION, EMULSION INTRAVENOUS at 09:16

## 2019-08-12 RX ADMIN — PROPOFOL 30 MG: 10 INJECTION, EMULSION INTRAVENOUS at 09:13

## 2019-08-12 RX ADMIN — PROPOFOL 60 MG: 10 INJECTION, EMULSION INTRAVENOUS at 09:10

## 2019-08-12 RX ADMIN — PROPOFOL 90 MG: 10 INJECTION, EMULSION INTRAVENOUS at 09:06

## 2019-08-12 RX ADMIN — DEXTROSE AND SODIUM CHLORIDE 30 ML/HR: 5; 450 INJECTION, SOLUTION INTRAVENOUS at 08:37

## 2019-08-12 NOTE — ANESTHESIA PREPROCEDURE EVALUATION
Anesthesia Evaluation     Patient summary reviewed and Nursing notes reviewed   NPO Solid Status: > 8 hours  NPO Liquid Status: > 8 hours           Airway   Mallampati: II  TM distance: >3 FB  Neck ROM: full  No difficulty expected  Dental    (+) poor dentition        Pulmonary - normal exam   (+) a smoker Current Smoked day of surgery, COPD,   Cardiovascular - negative cardio ROS and normal exam        Neuro/Psych  (+) psychiatric history Depression,     GI/Hepatic/Renal/Endo    (+)  GERD,  hypothyroidism,     Musculoskeletal (-) negative ROS    Abdominal  - normal exam   Substance History - negative use     OB/GYN negative ob/gyn ROS   (-)  Pregnant        Other - negative ROS                       Anesthesia Plan    ASA 2     MAC     intravenous induction   Anesthetic plan, all risks, benefits, and alternatives have been provided, discussed and informed consent has been obtained with: patient.

## 2019-08-12 NOTE — INTERVAL H&P NOTE
H&P reviewed. The patient was examined and there are no changes to the H&P.    Risks and benefits discussed with patient patient understands these and would like to proceed with procedure.

## 2019-08-12 NOTE — ANESTHESIA POSTPROCEDURE EVALUATION
Patient: Maryuri Gutierrez    Procedure Summary     Date:  08/12/19 Room / Location:  [facility] ENDOSCOPY 1 / [facility] ENDOSCOPY    Anesthesia Start:  0904 Anesthesia Stop:  0920    Procedures:       ESOPHAGOGASTRODUODENOSCOPY (N/A )      COLONOSCOPY--look TI (N/A ) Diagnosis:       Diarrhea, unspecified type      Generalized abdominal pain      Nausea      Gastroesophageal reflux disease with esophagitis      History of colitis      (Diarrhea, unspecified type [R19.7])      (Generalized abdominal pain [R10.84])      (Nausea [R11.0])      (Gastroesophageal reflux disease with esophagitis [K21.0])      (History of colitis [Z87.19])    Surgeon:  Neftali Beckman MD Provider:  Steve Ramachandran CRNA    Anesthesia Type:  MAC ASA Status:  2          Anesthesia Type: MAC  Last vitals  BP   142/81 (08/12/19 0830)   Temp   96.6 °F (35.9 °C) (08/12/19 0830)   Pulse   82 (08/12/19 0830)   Resp   16 (08/12/19 0830)     SpO2   97 % (08/12/19 0830)     Post Anesthesia Care and Evaluation    Patient location during evaluation: bedside  Patient participation: complete - patient participated  Level of consciousness: awake and awake and alert  Pain score: 0  Pain management: satisfactory to patient  Airway patency: patent  Anesthetic complications: No anesthetic complications  PONV Status: none  Cardiovascular status: acceptable and stable  Respiratory status: acceptable, room air and spontaneous ventilation  Hydration status: acceptable

## 2019-08-13 LAB
LAB AP CASE REPORT: NORMAL
LAB AP DIAGNOSIS COMMENT: NORMAL
PATH REPORT.FINAL DX SPEC: NORMAL
PATH REPORT.GROSS SPEC: NORMAL

## 2019-08-19 ENCOUNTER — OFFICE VISIT (OUTPATIENT)
Dept: GASTROENTEROLOGY | Facility: CLINIC | Age: 54
End: 2019-08-19

## 2019-08-19 VITALS
HEIGHT: 68 IN | SYSTOLIC BLOOD PRESSURE: 128 MMHG | WEIGHT: 199 LBS | HEART RATE: 95 BPM | BODY MASS INDEX: 30.16 KG/M2 | DIASTOLIC BLOOD PRESSURE: 68 MMHG

## 2019-08-19 DIAGNOSIS — K21.9 GASTROESOPHAGEAL REFLUX DISEASE WITHOUT ESOPHAGITIS: ICD-10-CM

## 2019-08-19 DIAGNOSIS — R19.7 DIARRHEA, UNSPECIFIED TYPE: Primary | ICD-10-CM

## 2019-08-19 DIAGNOSIS — R10.84 GENERALIZED ABDOMINAL PAIN: ICD-10-CM

## 2019-08-19 DIAGNOSIS — D12.6 TUBULAR ADENOMA OF COLON: ICD-10-CM

## 2019-08-19 PROCEDURE — 99214 OFFICE O/P EST MOD 30 MIN: CPT | Performed by: PHYSICIAN ASSISTANT
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
## 2019-08-19 NOTE — PATIENT INSTRUCTIONS

## 2019-08-22 ENCOUNTER — HOSPITAL ENCOUNTER (OUTPATIENT)
Dept: GENERAL RADIOLOGY | Facility: HOSPITAL | Age: 54
Discharge: HOME | End: 2019-08-22
Admitting: PHYSICIAN ASSISTANT

## 2019-08-22 PROCEDURE — 74250 X-RAY XM SM INT 1CNTRST STD: CPT

## 2019-08-22 RX ADMIN — BARIUM SULFATE 600 ML: 960 POWDER, FOR SUSPENSION ORAL at 07:12

## 2021-03-15 ENCOUNTER — IMMUNIZATION (OUTPATIENT)
Dept: VACCINE CLINIC | Facility: HOSPITAL | Age: 56
End: 2021-03-15

## 2021-03-15 PROCEDURE — 91300 HC SARSCOV02 VAC 30MCG/0.3ML IM: CPT | Performed by: THORACIC SURGERY (CARDIOTHORACIC VASCULAR SURGERY)

## 2021-03-15 PROCEDURE — 0001A: CPT | Performed by: THORACIC SURGERY (CARDIOTHORACIC VASCULAR SURGERY)

## 2021-03-22 ENCOUNTER — TRANSCRIBE ORDERS (OUTPATIENT)
Dept: ORTHOPEDIC SURGERY | Facility: CLINIC | Age: 56
End: 2021-03-22

## 2021-03-22 DIAGNOSIS — M25.561 RIGHT KNEE PAIN, UNSPECIFIED CHRONICITY: Primary | ICD-10-CM

## 2021-04-05 ENCOUNTER — IMMUNIZATION (OUTPATIENT)
Dept: VACCINE CLINIC | Facility: HOSPITAL | Age: 56
End: 2021-04-05

## 2021-04-05 PROCEDURE — 0002A: CPT | Performed by: THORACIC SURGERY (CARDIOTHORACIC VASCULAR SURGERY)

## 2021-04-05 PROCEDURE — 91300 HC SARSCOV02 VAC 30MCG/0.3ML IM: CPT | Performed by: THORACIC SURGERY (CARDIOTHORACIC VASCULAR SURGERY)

## 2021-04-07 DIAGNOSIS — M25.561 ACUTE PAIN OF RIGHT KNEE: Primary | ICD-10-CM

## 2021-04-12 ENCOUNTER — OFFICE VISIT (OUTPATIENT)
Dept: ORTHOPEDIC SURGERY | Facility: CLINIC | Age: 56
End: 2021-04-12

## 2021-04-12 VITALS — WEIGHT: 191 LBS | BODY MASS INDEX: 28.95 KG/M2 | HEIGHT: 68 IN

## 2021-04-12 DIAGNOSIS — M17.0 PRIMARY OSTEOARTHRITIS OF KNEES, BILATERAL: ICD-10-CM

## 2021-04-12 DIAGNOSIS — M25.561 ACUTE PAIN OF RIGHT KNEE: Primary | ICD-10-CM

## 2021-04-12 DIAGNOSIS — M23.91 INTERNAL DERANGEMENT OF RIGHT KNEE: ICD-10-CM

## 2021-04-12 PROCEDURE — 99202 OFFICE O/P NEW SF 15 MIN: CPT | Performed by: NURSE PRACTITIONER

## 2021-04-12 RX ORDER — DICLOFENAC SODIUM 75 MG/1
75 TABLET, DELAYED RELEASE ORAL 2 TIMES DAILY
COMMUNITY
Start: 2021-03-15

## 2021-04-12 RX ORDER — HYDROCODONE BITARTRATE AND ACETAMINOPHEN 7.5; 325 MG/1; MG/1
1 TABLET ORAL 2 TIMES DAILY PRN
COMMUNITY
Start: 2021-03-23
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
## 2021-04-20 ENCOUNTER — OFFICE VISIT (OUTPATIENT)
Dept: ORTHOPEDIC SURGERY | Facility: CLINIC | Age: 56
End: 2021-04-20

## 2021-04-20 VITALS — WEIGHT: 195.1 LBS | BODY MASS INDEX: 29.57 KG/M2 | HEIGHT: 68 IN

## 2021-04-20 DIAGNOSIS — M25.561 ACUTE PAIN OF RIGHT KNEE: Primary | ICD-10-CM

## 2021-04-20 DIAGNOSIS — M17.11 ARTHRITIS OF RIGHT KNEE: ICD-10-CM

## 2021-04-20 PROCEDURE — 99212 OFFICE O/P EST SF 10 MIN: CPT | Performed by: NURSE PRACTITIONER

## 2021-04-20 PROCEDURE — 20610 DRAIN/INJ JOINT/BURSA W/O US: CPT | Performed by: NURSE PRACTITIONER

## 2021-04-20 RX ADMIN — TRIAMCINOLONE ACETONIDE 40 MG: 40 INJECTION, SUSPENSION INTRA-ARTICULAR; INTRAMUSCULAR at 14:46

## 2021-04-20 RX ADMIN — LIDOCAINE HYDROCHLORIDE 2 ML: 10 INJECTION, SOLUTION INFILTRATION; PERINEURAL at 14:46

## 2021-04-20 NOTE — PROGRESS NOTES
"Maryuri Gutierrez is a 56 y.o. female      Chief Complaint   Patient presents with   • Right Knee - Follow-up       HISTORY OF PRESENT ILLNESS: Patient is a 56-year-old female who presents today to follow-up on right knee pain.  At last appointment patient had reported chronic knee pain that has been present for the past several years intermittently however she recently has experienced a flareup of pain that has been present for at least the last month.  She reports history of right knee scope, but she is unsure exactly what was done.  Standing, walking, bending lifting aggravate the pain.  She reports intermittent swelling.  She denies catching and locking but does have some popping and buckling.  She continues to deny burning, tingling, numbness, fever, nausea, vomiting.  She had been trying diclofenac, however this was stopped due to increased blood pressure.  She was also given hinged knee brace and instructed to perform rice therapy, HEP, and use cane or crutch at last appointment.  She reports that this is helped.  She is requesting knee brace for her left knee as well.  At last appointment she was interested in steroid injection, however she was due to have Covid vaccination and therefore opted to wait 2 weeks post Covid vaccination prior to receiving intra-articular injection.  She reports her pain is a 6 out of 10 today.         CONCURRENT MEDICAL HISTORY:    The following portions of the patient's history were reviewed and updated as appropriate: allergies, current medications, past family history, past medical history, past social history, past surgical history and problem list.     ROS  No fevers or chills.  No chest pain or shortness of air.  No GI or  disturbances.  Right knee pain.    PHYSICAL EXAMINATION:       Ht 172.7 cm (68\")   Wt 88.5 kg (195 lb 1.6 oz)   BMI 29.66 kg/m²     Physical Exam  Vitals and nursing note reviewed.   Constitutional:       General: She is not in acute " distress.     Appearance: She is well-developed. She is not toxic-appearing.   HENT:      Head: Normocephalic.   Pulmonary:      Effort: Pulmonary effort is normal. No respiratory distress.   Musculoskeletal:      Right knee: No effusion.      Left knee: No effusion.   Skin:     General: Skin is warm and dry.   Neurological:      Mental Status: She is alert and oriented to person, place, and time.   Psychiatric:         Behavior: Behavior normal.         Thought Content: Thought content normal.         Judgment: Judgment normal.         GAIT:     []  Normal  []  Antalgic    Assistive device: []  None  []  Walker     []  Crutches  []  Cane     []  Wheelchair  []  Stretcher    Right Knee Exam     Tenderness   Right knee tenderness location: Diffuse.    Range of Motion   Extension: -5   Flexion: 110     Tests   Varus: negative Valgus: negative  Drawer:  Anterior - negative      Patellar apprehension: negative    Other   Erythema: absent  Pulse: present  Swelling: mild  Effusion: no effusion present    Comments:  Stable knee joint  No evidence of infection  Pain and limitations with arc of motion      Left Knee Exam     Tenderness   The patient is experiencing no tenderness.     Range of Motion   Extension: 0   Flexion: 140     Tests   Drawer:  Anterior - negative       Patellar apprehension: negative    Other   Erythema: absent  Swelling: none  Effusion: no effusion present              XR Knee Bilateral AP Standing    Result Date: 4/12/2021  Narrative: Study: XR knee bilateral AP standing Comparison: None Narrative: Bilateral knees are acceptable in alignment and position.  Bilateral knees are with mild to moderate tricompartmental joint space narrowing and sclerotic changes to tibial plateau surfaces.  No evidence of fracture or dislocation in either knee.  Christal Meng  APRN 4/12/2021.            ASSESSMENT:    Diagnoses and all orders for this visit:    Acute pain of right knee    Arthritis of right knee    Other  orders  -     Large Joint Arthrocentesis: R knee          PLAN  Patient's Body mass index is 29.66 kg/m².       Return in about 4 weeks (around 5/18/2021).       Large Joint Arthrocentesis: R knee  Date/Time: 4/20/2021 2:46 PM  Consent given by: patient  Site marked: site marked  Timeout: Immediately prior to procedure a time out was called to verify the correct patient, procedure, equipment, support staff and site/side marked as required   Supporting Documentation  Indications: pain   Procedure Details  Location: knee - R knee  Preparation: Patient was prepped and draped in the usual sterile fashion  Needle size: 22 G  Approach: anteromedial  Medications administered: 40 mg triamcinolone acetonide 40 MG/ML; 2 mL lidocaine 1 %  Patient tolerance: patient tolerated the procedure well with no immediate complications          X-rays, previous charting, labs reviewed.  Patient has tried prescription strength NSAIDs, OTC medications, modified weightbearing, activity modification, HEP, rice therapy with some relief but not significant relief of symptoms.  Risk, benefits, alternatives to intra-articular injection reviewed with patient today patient verbalized understanding.  Patient tolerated injection well.  Patient instructed to continue conservative management and return to see me in 4 to 6 weeks for recheck.  If not getting better may proceed with either physical therapy or MRI as patient desires.  I spent at least 10 to 15 minutes face-to-face with patient discussing current plan of care and available options if injection is not helpful.    Christal Meng, APRN

## 2021-04-21 RX ORDER — LIDOCAINE HYDROCHLORIDE 10 MG/ML
2 INJECTION, SOLUTION INFILTRATION; PERINEURAL
Status: COMPLETED | OUTPATIENT
Start: 2021-04-20 | End: 2021-04-20

## 2021-04-21 RX ORDER — TRIAMCINOLONE ACETONIDE 40 MG/ML
40 INJECTION, SUSPENSION INTRA-ARTICULAR; INTRAMUSCULAR
Status: COMPLETED | OUTPATIENT
Start: 2021-04-20 | End: 2021-04-20

## 2021-06-28 ENCOUNTER — OFFICE VISIT (OUTPATIENT)
Dept: ORTHOPEDIC SURGERY | Facility: CLINIC | Age: 56
End: 2021-06-28

## 2021-06-28 VITALS — HEIGHT: 68 IN | WEIGHT: 192 LBS | BODY MASS INDEX: 29.1 KG/M2

## 2021-06-28 DIAGNOSIS — M23.91 INTERNAL DERANGEMENT OF RIGHT KNEE: ICD-10-CM

## 2021-06-28 DIAGNOSIS — G89.29 CHRONIC PAIN OF RIGHT KNEE: Primary | ICD-10-CM

## 2021-06-28 DIAGNOSIS — M25.561 CHRONIC PAIN OF RIGHT KNEE: Primary | ICD-10-CM

## 2021-06-28 DIAGNOSIS — M17.11 ARTHRITIS OF RIGHT KNEE: ICD-10-CM

## 2021-06-28 PROCEDURE — 99213 OFFICE O/P EST LOW 20 MIN: CPT | Performed by: NURSE PRACTITIONER

## 2021-06-28 NOTE — PROGRESS NOTES
"Maryuri Gutierrez is a 56 y.o. female     Chief Complaint   Patient presents with   • Right Knee - Follow-up       HISTORY OF PRESENT ILLNESS:    Patient is a 56-year-old female who presents today for follow-up of right knee pain.  Patient reports chronic right knee pain that has been present intermittently over the past 7 years, however around March or April of this year she experienced a increase in her pain.  This new pain has been persistent.  She reports history of right knee scope, but is unsure what was done.  Standing, walking, bending continue to aggravate pain.  She continues to have intermittent swelling.  At last appointment she denies catching and locking but since last appointment has also developed the symptoms as well.  She continues to have some popping and buckling.  She reports increased pain with walking down hills and going up and down stairs.  She has pain with rolling over in bed at night.  She continues to deny burning, tingling, numbness, fever, nausea, vomiting.  She has tried diclofenac, hinged knee brace, rice therapy, HEP, intra-articular injection.  Patient reports intra-articular injection lasted for around a week before symptoms began to return.  She reports her pain is a 5 out of 10 today.  He has not using assistive device.  She reports that she has started to notice some left knee pain as well.     CONCURRENT MEDICAL HISTORY:    The following portions of the patient's history were reviewed and updated as appropriate: allergies, current medications, past family history, past medical history, past social history, past surgical history and problem list.     ROS  No fevers or chills.  No chest pain or shortness of air.  No GI or  disturbances.  Right knee pain.    PHYSICAL EXAMINATION:       Ht 172.7 cm (68\")   Wt 87.1 kg (192 lb)   BMI 29.19 kg/m²     Physical Exam  Vitals and nursing note reviewed.   Constitutional:       General: She is not in acute distress.     " Appearance: She is well-developed. She is not toxic-appearing.   HENT:      Head: Normocephalic.   Pulmonary:      Effort: Pulmonary effort is normal. No respiratory distress.   Musculoskeletal:      Right knee: No effusion.      Instability Tests: Lateral Isela test positive. Medial Isela test negative.      Left knee: No effusion.   Skin:     General: Skin is warm and dry.   Neurological:      Mental Status: She is alert and oriented to person, place, and time.   Psychiatric:         Behavior: Behavior normal.         Thought Content: Thought content normal.         Judgment: Judgment normal.         GAIT:     [x]  Normal  []  Antalgic    Assistive device: [x]  None  []  Walker     []  Crutches  []  Cane     []  Wheelchair  []  Stretcher    Right Knee Exam     Tenderness   Right knee tenderness location: Anterior.    Range of Motion   Extension: -5   Flexion: 110     Tests   Isela:  Medial - negative Lateral - positive  Varus: negative Valgus: negative  Drawer:  Anterior - negative      Patellar apprehension: negative    Other   Erythema: absent  Pulse: present  Swelling: mild  Effusion: no effusion present    Comments:  Stable knee joint  No evidence of infection  Pain and limitations with arc of motion      Left Knee Exam     Other   Erythema: absent  Swelling: none  Effusion: no effusion present                Study: XR knee bilateral AP standing  Comparison: None  Narrative: Bilateral knees are acceptable in alignment and position.  Bilateral knees are with mild to moderate tricompartmental joint space narrowing and sclerotic changes to tibial plateau surfaces.  No evidence of fracture or dislocation in either knee.  Christal BLEVINS 4/12/2021        ASSESSMENT:    Diagnoses and all orders for this visit:    Chronic pain of right knee    Arthritis of right knee    Internal derangement of right knee          PLAN      Patient has positive Isela testing and symptoms to suggest meniscal pathology.   Patient has tried conservative measures including NSAIDs, hinged knee brace, rice therapy.  Patient also reports doing HEP.  Patient has had intra-articular injection without significant relief of symptoms.  MRI ordered, patient to return to review MRI results.  Patient is interested in surgical intervention if indicated.  Patient reports some increased left knee pain recently, suspect this is secondary to increased demand.  Again recommend that patient use cane or crutch for modified weightbearing so that she may take weight off right knee without sacrificing left knee.  Patient verbalized understanding.    Return for MRI results.    Christal Meng, APRN

## 2021-07-16 ENCOUNTER — APPOINTMENT (OUTPATIENT)
Dept: MRI IMAGING | Facility: HOSPITAL | Age: 56
End: 2021-07-16

## 2021-11-02 ENCOUNTER — HOSPITAL ENCOUNTER (OUTPATIENT)
Dept: MRI IMAGING | Facility: HOSPITAL | Age: 56
Discharge: HOME | End: 2021-11-02
Admitting: NURSE PRACTITIONER

## 2021-11-02 DIAGNOSIS — M23.91 INTERNAL DERANGEMENT OF RIGHT KNEE: ICD-10-CM

## 2021-11-02 PROCEDURE — 73721 MRI JNT OF LWR EXTRE W/O DYE: CPT

## 2021-11-05 ENCOUNTER — OFFICE VISIT (OUTPATIENT)
Dept: ORTHOPEDIC SURGERY | Facility: CLINIC | Age: 56
End: 2021-11-05

## 2021-11-05 VITALS — HEIGHT: 68 IN | WEIGHT: 184.5 LBS | BODY MASS INDEX: 27.96 KG/M2

## 2021-11-05 DIAGNOSIS — M25.561 CHRONIC PAIN OF RIGHT KNEE: Primary | ICD-10-CM

## 2021-11-05 DIAGNOSIS — G89.29 CHRONIC PAIN OF RIGHT KNEE: Primary | ICD-10-CM

## 2021-11-05 DIAGNOSIS — M94.261 CHONDROMALACIA OF KNEE, RIGHT: ICD-10-CM

## 2021-11-05 PROCEDURE — 99214 OFFICE O/P EST MOD 30 MIN: CPT | Performed by: NURSE PRACTITIONER

## 2021-11-05 RX ORDER — TRAMADOL HYDROCHLORIDE 50 MG/1
50 TABLET ORAL EVERY 6 HOURS PRN
Qty: 28 TABLET | Refills: 0 | Status: SHIPPED | OUTPATIENT
Start: 2021-11-05 | End: 2021-11-12

## 2021-11-05 RX ORDER — SENNOSIDES 8.6 MG
650 CAPSULE ORAL EVERY 8 HOURS PRN
COMMUNITY

## 2021-11-05 NOTE — PROGRESS NOTES
"Maryuri Gutierrez is a 56 y.o. female      Chief Complaint   Patient presents with   • Right Knee - Follow-up   • Results     MRI       HISTORY OF PRESENT ILLNESS:    Patient is a 56-year-old female who presents for follow-up on right knee pain.  Pain has been present intermittently over the past 7 to 8 years.  However pain increased greatly in March or April 2021.  Standing, walking, bending continue to aggravate pain.  She reports occasional locking, catching, popping, though in the beginning the symptoms were not present.  She reports occasional buckling.  She has no complaints of burning, tingling, numbness.  She reports history of right knee patella but is unsure what was performed.  She has tried diclofenac, rice therapy, intra-articular injection, brace.  She reports years ago she completed physical therapy and has recently been doing home exercise program.  She has been off work since 2019, she reports waking due to pain.    She is here to review MRI results.     CONCURRENT MEDICAL HISTORY:    The following portions of the patient's history were reviewed and updated as appropriate: allergies, current medications, past family history, past medical history, past social history, past surgical history and problem list.     ROS  No fevers or chills.  No chest pain or shortness of air.  No GI or  disturbances.    PHYSICAL EXAMINATION:       Ht 172.7 cm (68\")   Wt 83.7 kg (184 lb 8 oz)   BMI 28.05 kg/m²     Physical Exam  Vitals and nursing note reviewed.   Constitutional:       General: She is not in acute distress.     Appearance: She is well-developed. She is not toxic-appearing.   HENT:      Head: Normocephalic.   Pulmonary:      Effort: Pulmonary effort is normal. No respiratory distress.   Musculoskeletal:      Right knee: No effusion.   Skin:     General: Skin is warm and dry.   Neurological:      Mental Status: She is alert and oriented to person, place, and time.   Psychiatric:         " Behavior: Behavior normal.         Thought Content: Thought content normal.         Judgment: Judgment normal.         GAIT:     [x]  Normal  []  Antalgic    Assistive device: [x]  None  []  Walker     []  Crutches  []  Cane     []  Wheelchair  []  Stretcher    Right Knee Exam     Tenderness   Right knee tenderness location: Diffuse.    Range of Motion   Extension: -5   Flexion: 110     Other   Erythema: absent  Pulse: present  Swelling: none  Effusion: no effusion present    Comments:  Stable knee joint  No evidence of infection  Pain and limitations with arc of motion              MRI Knee Right Without Contrast    Result Date: 11/2/2021  Narrative: EXAM: MRI KNEE RIGHT  WO CONTRAST CLINICAL INDICATION: Right knee internal derangement COMPARISON: 4/12/2021 TECHNIQUE: The MRI was done using coronal PD and T2 fat sat, sagittal T1, PD and T2 fat sat and axial T2 fat sat images. FINDINGS: There is a small knee joint effusion. The ACL and PCL are intact. The quadriceps and patellar tendons are intact. Both menisci appear intact without tear. There is mild thinning and irregularity of articular cartilage in all 3 compartments most significantly involving the medial patellar facet, without any definite full-thickness cartilage defects. The visualized osseous structures otherwise have normal morphology and signal characteristics with no evidence of bone marrow edema, occult fractures, or marrow-replacing bone lesions.     Impression: 1. Mild tricompartmental chondromalacia most significantly involving the medial patellar facet. 2. Small knee joint effusion.  Electronically signed by:  Toy Kelsey MD  11/2/2021 5:37 PM CDT Workstation: [id number]            ASSESSMENT:    Diagnoses and all orders for this visit:    Chronic pain of right knee  -     traMADol (ULTRAM) 50 MG tablet; Take 1 tablet by mouth Every 6 (Six) Hours As Needed for Moderate Pain  for up to 7 days.    Chondromalacia of knee, right    Other orders  -      acetaminophen (TYLENOL) 650 MG 8 hr tablet; Take 650 mg by mouth Every 8 (Eight) Hours As Needed for Mild Pain .          PLAN    Available options discussed with patient.  Patient has tried many conservative measures including prescription NSAID, right, intra-articular injections, bracing.  Patient also reports HEP and PT.  Patient also tried rest and being off work.  MRI results of mild tricompartmental chondromalacia and small knee joint effusion explained.  Patient desires surgical consultation.  Patient instructed to return to see surgeon of choice at earliest convenience.    Recommend the following:    -Rest and activity modification as tolerated and based on pain.  -Modified weightbearing of the affected extremity with use of a cane or walker as needed.  -Gradual progression of weightbearing and activity as pain and swelling allow.  -Conditioning and strengthening exercises of the bilateral knees/legs.  -Elevation and ice therapy to the affected knee to minimize pain/swelling/inflammation.   Patient request something for pain if OTC medications have been ineffective in controlling her pain.  Risk, benefits, alternatives to opioid pain medication reviewed with patient.  Patient verbalized understanding and desires to proceed with Ultram today.  Internal epic [facility] reviewed through PDMP.               Return for Surgical consult.    GEN Mcneal

## 2021-11-08 PROBLEM — M94.261 CHONDROMALACIA OF KNEE, RIGHT: Status: ACTIVE | Noted: 2021-11-08

## 2021-11-08 PROBLEM — G89.29 CHRONIC PAIN OF RIGHT KNEE: Status: ACTIVE | Noted: 2021-04-20

## 2021-11-12 ENCOUNTER — OFFICE VISIT (OUTPATIENT)
Dept: ORTHOPEDIC SURGERY | Facility: CLINIC | Age: 56
End: 2021-11-12

## 2021-11-12 VITALS
OXYGEN SATURATION: 98 % | WEIGHT: 184.1 LBS | DIASTOLIC BLOOD PRESSURE: 90 MMHG | HEIGHT: 68 IN | HEART RATE: 73 BPM | BODY MASS INDEX: 27.9 KG/M2 | SYSTOLIC BLOOD PRESSURE: 164 MMHG | RESPIRATION RATE: 18 BRPM

## 2021-11-12 DIAGNOSIS — M17.11 ARTHRITIS OF RIGHT KNEE: ICD-10-CM

## 2021-11-12 DIAGNOSIS — M94.261 CHONDROMALACIA OF KNEE, RIGHT: ICD-10-CM

## 2021-11-12 DIAGNOSIS — M25.561 CHRONIC PAIN OF RIGHT KNEE: Primary | ICD-10-CM

## 2021-11-12 DIAGNOSIS — G89.29 CHRONIC PAIN OF RIGHT KNEE: Primary | ICD-10-CM

## 2021-11-12 DIAGNOSIS — M23.91 INTERNAL DERANGEMENT OF RIGHT KNEE: ICD-10-CM

## 2021-11-12 DIAGNOSIS — M17.0 PRIMARY OSTEOARTHRITIS OF KNEES, BILATERAL: ICD-10-CM

## 2021-11-12 PROCEDURE — 20610 DRAIN/INJ JOINT/BURSA W/O US: CPT | Performed by: ORTHOPAEDIC SURGERY

## 2021-11-12 PROCEDURE — 99214 OFFICE O/P EST MOD 30 MIN: CPT | Performed by: ORTHOPAEDIC SURGERY

## 2021-11-12 RX ORDER — TRIAMCINOLONE ACETONIDE 40 MG/ML
80 INJECTION, SUSPENSION INTRA-ARTICULAR; INTRAMUSCULAR
Status: COMPLETED | OUTPATIENT
Start: 2021-11-12 | End: 2021-11-12

## 2021-11-12 RX ORDER — LIDOCAINE HYDROCHLORIDE 10 MG/ML
2 INJECTION, SOLUTION INFILTRATION; PERINEURAL
Status: COMPLETED | OUTPATIENT
Start: 2021-11-12 | End: 2021-11-12

## 2021-11-12 RX ORDER — BUPIVACAINE HYDROCHLORIDE 5 MG/ML
3 INJECTION, SOLUTION PERINEURAL
Status: COMPLETED | OUTPATIENT
Start: 2021-11-12 | End: 2021-11-12

## 2021-11-12 RX ADMIN — TRIAMCINOLONE ACETONIDE 80 MG: 40 INJECTION, SUSPENSION INTRA-ARTICULAR; INTRAMUSCULAR at 08:58

## 2021-11-12 RX ADMIN — LIDOCAINE HYDROCHLORIDE 2 ML: 10 INJECTION, SOLUTION INFILTRATION; PERINEURAL at 08:58

## 2021-11-12 RX ADMIN — BUPIVACAINE HYDROCHLORIDE 3 ML: 5 INJECTION, SOLUTION PERINEURAL at 08:58
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
## (undated) DEVICE — SINGLE-USE BIOPSY FORCEPS: Brand: RADIAL JAW 4

## (undated) DEVICE — Device: Brand: DISPOSABLE ELECTROSURGICAL SNARE

## (undated) DEVICE — TRAP SXN POLYP QUICKCATCH LF

## (undated) DEVICE — BITEBLOCK ENDO W/STRAP 60F A/ LF DISP